# Patient Record
Sex: MALE | Race: WHITE | NOT HISPANIC OR LATINO | Employment: OTHER | URBAN - METROPOLITAN AREA
[De-identification: names, ages, dates, MRNs, and addresses within clinical notes are randomized per-mention and may not be internally consistent; named-entity substitution may affect disease eponyms.]

---

## 2017-01-20 ENCOUNTER — TELEPHONE (OUTPATIENT)
Dept: CARDIOLOGY | Facility: CLINIC | Age: 57
End: 2017-01-20

## 2017-01-20 NOTE — TELEPHONE ENCOUNTER
Patient is having tooth extracted. Needing to hold Xarelto 2-3 days. Is this ok?     Dr Moreno  P: [phone]  F: [phone]

## 2017-07-05 ENCOUNTER — CLINICAL SUPPORT NO REQUIREMENTS (OUTPATIENT)
Dept: CARDIOLOGY | Facility: CLINIC | Age: 57
End: 2017-07-05

## 2017-07-05 DIAGNOSIS — I63.9 CRYPTOGENIC STROKE (HCC): ICD-10-CM

## 2017-07-05 PROCEDURE — 93299 PR REM INTERROG ICPMS/SCRMS <30 D TECH REVIEW: CPT | Performed by: INTERNAL MEDICINE

## 2017-07-05 PROCEDURE — 93298 REM INTERROG DEV EVAL SCRMS: CPT | Performed by: INTERNAL MEDICINE

## 2017-08-18 RX ORDER — RIVAROXABAN 20 MG/1
TABLET, FILM COATED ORAL
Qty: 30 TABLET | Refills: 6 | Status: SHIPPED | OUTPATIENT
Start: 2017-08-18 | End: 2018-03-25 | Stop reason: SDUPTHER

## 2017-11-30 ENCOUNTER — TELEPHONE (OUTPATIENT)
Dept: CARDIOLOGY | Facility: CLINIC | Age: 57
End: 2017-11-30

## 2017-11-30 NOTE — TELEPHONE ENCOUNTER
I called Mr Winter to ask if could send in a manual transmission with his carelink monitor.He stated he did not think he had  a monitor and if he did have one he had not seen it in years. He will look for it when he gets home and call me back. I also ask him to reschedule his appointment with Dr Aldrich that he missed in March. He stated he would call back and reschedule.

## 2018-03-26 RX ORDER — RIVAROXABAN 20 MG/1
TABLET, FILM COATED ORAL
Qty: 30 TABLET | Refills: 5 | Status: SHIPPED | OUTPATIENT
Start: 2018-03-26 | End: 2018-09-30 | Stop reason: SDUPTHER

## 2018-05-30 ENCOUNTER — CLINICAL SUPPORT NO REQUIREMENTS (OUTPATIENT)
Dept: CARDIOLOGY | Facility: CLINIC | Age: 58
End: 2018-05-30

## 2018-05-30 DIAGNOSIS — I63.9 ACUTE CVA (CEREBROVASCULAR ACCIDENT) (HCC): Primary | ICD-10-CM

## 2018-05-31 PROCEDURE — 93298 REM INTERROG DEV EVAL SCRMS: CPT | Performed by: INTERNAL MEDICINE

## 2018-05-31 PROCEDURE — 93299 PR REM INTERROG ICPMS/SCRMS <30 D TECH REVIEW: CPT | Performed by: INTERNAL MEDICINE

## 2018-09-12 ENCOUNTER — CLINICAL SUPPORT NO REQUIREMENTS (OUTPATIENT)
Dept: CARDIOLOGY | Facility: CLINIC | Age: 58
End: 2018-09-12

## 2018-09-12 DIAGNOSIS — I48.92 ATRIAL FIBRILLATION AND FLUTTER (HCC): ICD-10-CM

## 2018-09-12 DIAGNOSIS — I48.91 ATRIAL FIBRILLATION AND FLUTTER (HCC): ICD-10-CM

## 2018-09-12 PROCEDURE — 93299 PR REM INTERROG ICPMS/SCRMS <30 D TECH REVIEW: CPT | Performed by: INTERNAL MEDICINE

## 2018-09-12 PROCEDURE — 93298 REM INTERROG DEV EVAL SCRMS: CPT | Performed by: INTERNAL MEDICINE

## 2018-09-26 ENCOUNTER — OFFICE VISIT (OUTPATIENT)
Dept: CARDIOLOGY | Facility: CLINIC | Age: 58
End: 2018-09-26

## 2018-09-26 VITALS
SYSTOLIC BLOOD PRESSURE: 130 MMHG | BODY MASS INDEX: 31.95 KG/M2 | HEART RATE: 67 BPM | WEIGHT: 249 LBS | DIASTOLIC BLOOD PRESSURE: 84 MMHG | HEIGHT: 74 IN

## 2018-09-26 DIAGNOSIS — Z45.09 ENCOUNTER FOR LOOP RECORDER CHECK: ICD-10-CM

## 2018-09-26 DIAGNOSIS — I10 ESSENTIAL HYPERTENSION: Primary | ICD-10-CM

## 2018-09-26 PROCEDURE — 93291 INTERROG DEV EVAL SCRMS IP: CPT | Performed by: PHYSICIAN ASSISTANT

## 2018-09-26 PROCEDURE — 99213 OFFICE O/P EST LOW 20 MIN: CPT | Performed by: PHYSICIAN ASSISTANT

## 2018-09-26 RX ORDER — CIPROFLOXACIN 500 MG/1
500 TABLET, FILM COATED ORAL 2 TIMES DAILY
Refills: 0 | COMMUNITY
Start: 2018-09-25 | End: 2020-01-29

## 2018-09-26 NOTE — PROGRESS NOTES
[facility] at [facility]   OFFICE NOTE      Donavan Winter  1960  PCP: Hayden Estrada MD    SUBJECTIVE:   Donavan Winter is a 58 y.o. male seen for a follow up visit regarding the following: prior CVA, HTN, PFO, Loop recorder    CC: Loop recorder interrogation  PROBLEM LIST:  1. Acute CVA, 06/22/2013.   a. A 7 mm discrete peripheral acute infarct of the left cerebellar hemisphere with small punctate acute infarcts at the central left cerebellum by MRI, 06/22/2013.  b. Transesophageal echocardiogram, 06/24/2013 - no evidence of thrombus with PFO noted.  c. Unremarkable carotid artery duplex.  d. Carotid vertebral angiography, 06/25/2013, unremarkable cervical and cerebral angiograms, widely patent left PICA.  e. Initiation of Xarelto and implantable loop recorder placement, 07/16/2013 to rule out the possibility of atrial fibrillation.   2. Chest pain.  a. GXT nuclear, 01/09/2014, left ventricular ejection fraction of 57%, small very focal stress induced apical defect, probably normal variant.  3. PFO noted by transesophageal echocardiogram, 06/24/2013./ Echo 2016 normal EF, Mild LVH, Mild-Moderate MR  4. AV malformation, incidental finding, June 2013 by MRI; AVM repair, 06/15/2014.  5. Hypertension.  6. Dyslipidemia.  7. GERD.  8. Intermittent tobacco abuse.  9. Obstructive sleep apnea with CPAP.  10. Ear infection -Cipro started per PCP this week  11. Surgical history:  a. 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   HPI:   Today Mr. Montes presents today for follow-up regarding prior history of cryptogenic CVA, hypertension, history of PFO and Loop recorder placement.  Mr. Winter states he's had no change in symptoms.  He has had no palpitations, dizziness, near-syncope, or syncope.  He reports his blood pressures well controlled.  He states she's had no recurrent stroke symptoms.  He states he's tolerating his Xarelto well with no bleeding issues.  He does have a recent ear  infection Dr. Estrada as prescribed him some Cipro.  He denies any chest pain or chest tightness suggesting as pectoris.  He denies heart failure symptoms.        ROS:  Review of Symptoms:  General: no recent weight loss/gain, weakness or fatigue  Skin: no rashes, lumps, or other skin changes  HEENT: recent ear infection  Respiratory: no cough or hemoptysis  Cardiovascular: no palpitations, and tachycardia  Gastrointestinal: no black/tarry stools or diarrhea  Urinary: no change in frequency or urgency  Peripheral Vascular: no claudication or leg cramps  Musculoskeletal: no muscle or joint pain/stiffness  Psychiatric: no depression or excessive stress  Neurological: no sensory or motor loss, no syncope  Hematologic: no anemia, easy bruising or bleeding  Endocrine: no thyroid problems, nor heat or cold intolerance    Cardiac PMH: (Old records have been reviewed and summarized below)      Past Medical History, Past Surgical History, Family history, Social History, and Medications were all reviewed with the patient today and updated as necessary.       Current Outpatient Prescriptions:   •  atorvastatin (LIPITOR) 40 MG tablet, Take 40 mg by mouth Daily., Disp: , Rfl: 0  •  cholecalciferol (VITAMIN D3) 1000 UNITS tablet, Take 4,000 Units by mouth Daily., Disp: , Rfl:   •  ciprofloxacin (CIPRO) 500 MG tablet, Take 500 mg by mouth 2 (Two) Times a Day., Disp: , Rfl: 0  •  coenzyme Q10 100 MG capsule, Take 200 mg by mouth Daily., Disp: , Rfl:   •  Esomeprazole Magnesium (NEXIUM 24HR PO), Take 1 capsule by mouth Daily., Disp: , Rfl:   •  lisinopril (PRINIVIL,ZESTRIL) 20 MG tablet, take 1 tablet by mouth once daily, Disp: , Rfl: 0  •  XARELTO 20 MG tablet, take 1 tablet by mouth once daily, Disp: 30 tablet, Rfl: 5      No Known Allergies  Patient Active Problem List   Diagnosis   • CVA (cerebral vascular accident) (CMS/HCC)   • Chest pain   • PFO (patent foramen ovale)   • AVM (arteriovenous malformation)   • Essential  "hypertension   • Dyslipidemia   • GERD (gastroesophageal reflux disease)   • MOJGAN on CPAP   • Encounter for loop recorder check     Past Medical History:   Diagnosis Date   • Acute CVA (cerebrovascular accident) (CMS/[facility])    • AVM (arteriovenous malformation)     incidental finding, June 2013 by MRI; AVM repair, 06/15/2014   • Chest pain     PT STATES THIS IS NO LONGER TRUE   • Dyslipidemia    • GERD (gastroesophageal reflux disease)    • History of loop recorder 09/15/2015     Interrogation of his implantable loop recorder demonstrates no significant atrial arrhythmias.     • Hypertension    • MOJGAN on CPAP    • PFO (patent foramen ovale)     noted by transesophageal echocardiogram, 06/24/2013   • Thrombocytopenia (CMS/HCC)     s/p splenectomy    • Tobacco abuse     intermittent      Past Surgical History:   Procedure Laterality Date   • BRAIN AVM REPAIR     • CARDIAC ELECTROPHYSIOLOGY PROCEDURE N/A 11/10/2016    Procedure: Loop recorder removal and reinsertion MDT;  Surgeon: Cruzito Aldrich MD;  Location: [facility] INVASIVE LOCATION;  Service:    • COLONOSCOPY      X6   • ENDOSCOPY     • OTHER SURGICAL HISTORY      implantable loop recorder    • SPLENECTOMY     • WISDOM TOOTH EXTRACTION       Family History   Problem Relation Age of Onset   • Heart attack Father      Social History   Substance Use Topics   • Smoking status: Former Smoker     Packs/day: 0.50     Years: 15.00     Types: Cigarettes     Quit date: 9/20/2013   • Smokeless tobacco: Never Used   • Alcohol use 0.6 - 1.2 oz/week     1 - 2 Cans of beer per week      Comment: occas         PHYSICAL EXAM:    /84 (BP Location: Left arm, Patient Position: Sitting)   Pulse 67   Ht 188 cm (74\")   Wt 113 kg (249 lb)   BMI 31.97 kg/m²        Wt Readings from Last 5 Encounters:   09/26/18 113 kg (249 lb)   11/10/16 115 kg (254 lb 10.1 oz)   09/20/16 111 kg (244 lb 9.6 oz)       BP Readings from Last 5 Encounters:   09/26/18 130/84   11/10/16 102/67 "   10/14/16 124/77   09/20/16 120/68       General appearance - Alert, well appearing, and in no distress   Mental status - Affect appropriate to mood.  Eyes - Sclerae anicteric,  ENMT - Hearing slightly decreased bilaterally, Dental hygiene good.  Neck - Carotids upstroke normal bilaterally, no bruits, no JVD.  Resp - Clear to auscultation, no wheezes, rales or rhonchi, symmetric air entry.  Heart - Normal rate, regular rhythm, normal S1, S2, no murmurs, rubs, clicks or gallops.  GI - Soft, nontender, nondistended, no masses or organomegaly.  Neurological - Grossly intact - normal speech, no focal findings  Musculoskeletal - No joint tenderness, deformity or swelling, no muscular tenderness noted.  Extremities - Peripheral pulses normal, no pedal edema, no clubbing or cyanosis.  Skin - Normal coloration and turgor.  Psych -  oriented to person, place, and time.    Medical problems and test results were reviewed with the patient today.     Echo 2016     · Left ventricular function is normal. Estimated EF = 54%.  · Left ventricular wall thickness is consistent with mild concentric hypertrophy.  · Mild-to-moderate mitral valve regurgitation is present       Device Interrogation:  Loop recorder interrogation reveals no evidence of significant arrhythmias battery voltage and life is good.    ASSESSMENT   1. Cryptogenic CVA:  Continue Xarelto.  Patient tolerating it well.   2. PFO: Echocardiogram 2016, normal EF, mild LVH,   2. HTN: Controlled  3. Loop recorder : Normal function.     PLAN  · Continue current medical therapy.   · Follow up in one year or sooner as needed.       9/26/2018  10:19 AM    Will Tessie CHAVARRIA

## 2018-10-01 RX ORDER — RIVAROXABAN 20 MG/1
TABLET, FILM COATED ORAL
Qty: 30 TABLET | Refills: 5 | Status: SHIPPED | OUTPATIENT
Start: 2018-10-01 | End: 2019-04-10 | Stop reason: SDUPTHER

## 2018-12-12 ENCOUNTER — CLINICAL SUPPORT NO REQUIREMENTS (OUTPATIENT)
Dept: CARDIOLOGY | Facility: CLINIC | Age: 58
End: 2018-12-12

## 2018-12-12 DIAGNOSIS — I63.9 CEREBROVASCULAR ACCIDENT (CVA), UNSPECIFIED MECHANISM (HCC): ICD-10-CM

## 2018-12-12 PROCEDURE — 93298 REM INTERROG DEV EVAL SCRMS: CPT | Performed by: INTERNAL MEDICINE

## 2018-12-12 PROCEDURE — 93299 PR REM INTERROG ICPMS/SCRMS <30 D TECH REVIEW: CPT | Performed by: INTERNAL MEDICINE

## 2019-01-04 ENCOUNTER — HOSPITAL ENCOUNTER (OUTPATIENT)
Dept: GENERAL RADIOLOGY | Facility: HOSPITAL | Age: 59
Discharge: HOME | End: 2019-01-04
Attending: INTERNAL MEDICINE | Admitting: INTERNAL MEDICINE

## 2019-01-04 ENCOUNTER — TRANSCRIBE ORDERS (OUTPATIENT)
Dept: ADMINISTRATIVE | Facility: HOSPITAL | Age: 59
End: 2019-01-04

## 2019-01-04 DIAGNOSIS — M79.671 ACUTE FOOT PAIN, RIGHT: Primary | ICD-10-CM

## 2019-01-04 PROCEDURE — 73630 X-RAY EXAM OF FOOT: CPT

## 2019-04-03 ENCOUNTER — CLINICAL SUPPORT NO REQUIREMENTS (OUTPATIENT)
Dept: CARDIOLOGY | Facility: CLINIC | Age: 59
End: 2019-04-03

## 2019-04-03 DIAGNOSIS — I63.9 CEREBROVASCULAR ACCIDENT (CVA), UNSPECIFIED MECHANISM (HCC): Primary | ICD-10-CM

## 2019-04-03 DIAGNOSIS — Q21.12 PFO (PATENT FORAMEN OVALE): ICD-10-CM

## 2019-04-03 DIAGNOSIS — I48.91 ATRIAL FIBRILLATION, UNSPECIFIED TYPE (HCC): ICD-10-CM

## 2019-04-03 PROCEDURE — 93299 PR REM INTERROG ICPMS/SCRMS <30 D TECH REVIEW: CPT | Performed by: INTERNAL MEDICINE

## 2019-04-03 PROCEDURE — 93298 REM INTERROG DEV EVAL SCRMS: CPT | Performed by: INTERNAL MEDICINE

## 2019-04-10 RX ORDER — RIVAROXABAN 20 MG/1
TABLET, FILM COATED ORAL
Qty: 90 TABLET | Refills: 1 | Status: SHIPPED | OUTPATIENT
Start: 2019-04-10 | End: 2019-10-07 | Stop reason: SDUPTHER

## 2019-07-03 ENCOUNTER — CLINICAL SUPPORT NO REQUIREMENTS (OUTPATIENT)
Dept: CARDIOLOGY | Facility: CLINIC | Age: 59
End: 2019-07-03

## 2019-07-03 ENCOUNTER — TELEPHONE (OUTPATIENT)
Dept: CARDIOLOGY | Facility: CLINIC | Age: 59
End: 2019-07-03

## 2019-07-03 DIAGNOSIS — I48.19 ATRIAL FIBRILLATION, PERSISTENT (HCC): ICD-10-CM

## 2019-07-03 PROCEDURE — 93298 REM INTERROG DEV EVAL SCRMS: CPT | Performed by: INTERNAL MEDICINE

## 2019-07-03 PROCEDURE — 93299 PR REM INTERROG ICPMS/SCRMS <30 D TECH REVIEW: CPT | Performed by: INTERNAL MEDICINE

## 2019-10-07 RX ORDER — RIVAROXABAN 20 MG/1
TABLET, FILM COATED ORAL
Qty: 90 TABLET | Refills: 3 | Status: SHIPPED | OUTPATIENT
Start: 2019-10-07 | End: 2020-10-05

## 2020-01-13 ENCOUNTER — TELEPHONE (OUTPATIENT)
Dept: CARDIOLOGY | Facility: CLINIC | Age: 60
End: 2020-01-13

## 2020-01-13 NOTE — TELEPHONE ENCOUNTER
Pt moved in October and does not know where his monitor is. He verbalized he would look for the monitor and set it up. I also transferred him to Sheri WILCOX To reschedule his last appointment with Dr Aldrich.

## 2020-01-29 ENCOUNTER — OFFICE VISIT (OUTPATIENT)
Dept: CARDIOLOGY | Facility: CLINIC | Age: 60
End: 2020-01-29

## 2020-01-29 VITALS
OXYGEN SATURATION: 98 % | BODY MASS INDEX: 34.01 KG/M2 | SYSTOLIC BLOOD PRESSURE: 142 MMHG | HEIGHT: 74 IN | DIASTOLIC BLOOD PRESSURE: 88 MMHG | HEART RATE: 62 BPM | WEIGHT: 265 LBS

## 2020-01-29 DIAGNOSIS — Q21.12 PFO (PATENT FORAMEN OVALE): ICD-10-CM

## 2020-01-29 DIAGNOSIS — I48.0 PAF (PAROXYSMAL ATRIAL FIBRILLATION) (HCC): Primary | ICD-10-CM

## 2020-01-29 DIAGNOSIS — I10 ESSENTIAL HYPERTENSION: ICD-10-CM

## 2020-01-29 PROCEDURE — 93291 INTERROG DEV EVAL SCRMS IP: CPT | Performed by: INTERNAL MEDICINE

## 2020-01-29 PROCEDURE — 99214 OFFICE O/P EST MOD 30 MIN: CPT | Performed by: INTERNAL MEDICINE

## 2020-01-29 RX ORDER — CHLORAL HYDRATE 500 MG
6000 CAPSULE ORAL
COMMUNITY

## 2020-01-29 NOTE — PROGRESS NOTES
Donavan BUSTILLO Winter  1960  Home phone not available    01/29/2020    [facility] CARDIOLOGY     Estrada, Hayden Farah MD  [address]    [place] [id number]    Chief Complaint   Patient presents with   • Hypertension       Problem List:   1. Acute CVA, 06/22/2013.   a. A 7 mm discrete peripheral acute infarct of the left cerebellar hemisphere with small punctate acute infarcts at the central left cerebellum by MRI, 06/22/2013.  b. Transesophageal echocardiogram, 06/24/2013 - no evidence of thrombus with PFO noted.  c. Unremarkable carotid artery duplex.  d. Carotid vertebral angiography, 06/25/2013, unremarkable cervical and cerebral angiograms, widely patent left PICA.  e. Initiation of Xarelto and implantable loop recorder placement, 07/16/2013 to rule out the possibility of atrial fibrillation.   2. Chest pain.  a. GXT nuclear, 01/09/2014, left ventricular ejection fraction of 57%, small very focal stress induced apical defect, probably normal variant.  3. PFO noted by transesophageal echocardiogram, 06/24/2013.Echo 2016 normal EF, Mild LVH, Mild-Moderate MR  4. AV malformation, incidental finding, June 2013 by MRI; AVM repair, 06/15/2014.  5. Hypertension.  6. Dyslipidemia.  7. GERD.  8. Intermittent tobacco abuse.  9. Obstructive sleep apnea with CPAP.  10. Ear infection -Cipro started per PCP this week  11. Surgical history:  a. 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      Allergies  No Known Allergies    Current Medications    Current Outpatient Medications:   •  atorvastatin (LIPITOR) 40 MG tablet, Take 40 mg by mouth Daily., Disp: , Rfl: 0  •  cholecalciferol (VITAMIN D3) 1000 UNITS tablet, Take 4,000 Units by mouth Daily., Disp: , Rfl:   •  coenzyme Q10 100 MG capsule, Take 200 mg by mouth Daily., Disp: , Rfl:   •  Esomeprazole Magnesium (NEXIUM 24HR PO), Take 1 capsule by mouth Daily., Disp: , Rfl:   •  lisinopril (PRINIVIL,ZESTRIL) 20 MG tablet, take 1 tablet by  "mouth once daily, Disp: , Rfl: 0  •  Omega-3 Fatty Acids (FISH OIL) 1000 MG capsule capsule, Take 6,000 mg by mouth Daily With Breakfast. 6 per day, Disp: , Rfl:   •  XARELTO 20 MG tablet, TAKE 1 TABLET BY MOUTH EVERY DAY, Disp: 90 tablet, Rfl: 3    History of Present Illness     Pt presents for follow up of CVA/HTN/PFO. Since we last saw the pt, pt denies any palps, SOB, CP, LH, and dizziness. Denies any hospitalizations, ER visits, bleeding, or TIA/CVA symptoms. Overall feels well. BP stable at home.     ROS:  General:  Denies fatigue, weight gain or loss  Cardiovascular:  Denies CP, PND, syncope, near syncope, edema or palpitations.  Pulmonary:  Denies ELI, cough, or wheezing      Vitals:    01/29/20 1130   BP: 142/88   BP Location: Right arm   Patient Position: Sitting   Pulse: 62   SpO2: 98%   Weight: 120 kg (265 lb)   Height: 188 cm (74\")     Body mass index is 34.02 kg/m².  PE:  General: NAD  Neck: no JVD, no carotid bruits, no TM  Heart RRR, NL S1, S2, S4 present, no rubs, murmurs  Lungs: CTA, no wheezes, rhonchi, or rales  Abd: soft, non-tender, NL BS  Ext: No musculoskeletal deformities, no edema, cyanosis, or clubbing  Psych: normal mood and affect    Diagnostic Data:    ILR: 2 episodes of PAF 6/12/19    Procedures    1. PAF (paroxysmal atrial fibrillation) (CMS/HCC)    2. Essential hypertension    3. PFO (patent foramen ovale)          Plan:  1. PAF: now with AF documented. Continue Xarelto.  Patient tolerating it well.   2. CVA: see # 1  3. PFO: Echocardiogram 2016, normal EF, mild LVH,   4. HTN: Controlled  5. Loop recorder : Normal function.        F/up in 12 months        "

## 2020-03-04 ENCOUNTER — CLINICAL SUPPORT NO REQUIREMENTS (OUTPATIENT)
Dept: CARDIOLOGY | Facility: CLINIC | Age: 60
End: 2020-03-04

## 2020-03-04 DIAGNOSIS — Q21.12 PFO (PATENT FORAMEN OVALE): Primary | ICD-10-CM

## 2020-03-04 DIAGNOSIS — I63.9 ACUTE CVA (CEREBROVASCULAR ACCIDENT) (HCC): ICD-10-CM

## 2020-03-04 PROCEDURE — G2066 INTER DEVC REMOTE 30D: HCPCS | Performed by: INTERNAL MEDICINE

## 2020-03-04 PROCEDURE — 93298 REM INTERROG DEV EVAL SCRMS: CPT | Performed by: INTERNAL MEDICINE

## 2020-05-13 ENCOUNTER — TELEPHONE (OUTPATIENT)
Dept: CARDIOLOGY | Facility: CLINIC | Age: 60
End: 2020-05-13

## 2020-06-03 ENCOUNTER — OFFICE VISIT (OUTPATIENT)
Dept: PULMONOLOGY | Facility: CLINIC | Age: 60
End: 2020-06-03

## 2020-06-03 VITALS
SYSTOLIC BLOOD PRESSURE: 142 MMHG | BODY MASS INDEX: 35.29 KG/M2 | HEART RATE: 65 BPM | DIASTOLIC BLOOD PRESSURE: 90 MMHG | OXYGEN SATURATION: 95 % | TEMPERATURE: 97.8 F | WEIGHT: 275 LBS | HEIGHT: 74 IN | RESPIRATION RATE: 16 BRPM

## 2020-06-03 DIAGNOSIS — I48.0 PAROXYSMAL ATRIAL FIBRILLATION (HCC): ICD-10-CM

## 2020-06-03 DIAGNOSIS — Z79.01 CHRONIC ANTICOAGULATION: ICD-10-CM

## 2020-06-03 DIAGNOSIS — R91.1 LUNG NODULE, SOLITARY: Primary | ICD-10-CM

## 2020-06-03 PROCEDURE — 99204 OFFICE O/P NEW MOD 45 MIN: CPT | Performed by: INTERNAL MEDICINE

## 2020-06-03 RX ORDER — LISINOPRIL 10 MG/1
10 TABLET ORAL DAILY
COMMUNITY
Start: 2020-04-10

## 2020-06-03 RX ORDER — TAMSULOSIN HYDROCHLORIDE 0.4 MG/1
1 CAPSULE ORAL
COMMUNITY
Start: 2020-05-26

## 2020-06-03 RX ORDER — ESOMEPRAZOLE MAGNESIUM 40 MG/1
40 CAPSULE, DELAYED RELEASE ORAL
COMMUNITY

## 2020-06-03 NOTE — PROGRESS NOTES
New Patient Pulmonary Office Visit      Patient Name: Donavan Winter    Referring Physician: Hayden Estrada MD    Chief Complaint:    Chief Complaint   Patient presents with   • Lung Nodule       History of Present Illness: Donavan Winter is a 60 y.o. male who is here today to establish care with Pulmonary.  Patient has a past medical history significant for a CVA in 2013, paroxysmal atrial fibrillation, hypertension, AVM s/p ressection and a PFO.  Who presents to pulmonary for evaluation of a pulmonary nodule.    Lung Nodule  He presents for evaluation and treatment of a lung nodule. The CT coronary from March 2020 incidentally showed a 5 mm nodule, pleural-based in the right lower lobe. The patient reports that the imaging was performed for coronary screening.  The patient denies other associated symptoms. He has a history of 20 pack years. The patient has no known exposure to tuberculosis. The patient does not have a history of cancer.  Patient notes that he works in the chito business for construction, and is around a significant mound of dirt, but no other exposure history.  He has not had any other CTs of his chest to see if the nodule was there in the past.  He is on Xarelto for atrial fibrillation and a previous CVA that occurred in 2013.  He denies having any breathing difficulties, chest pain, fever, chills, night sweats, or weight loss.  He notes that he exercises 3 to 4 days/week.      Review of Systems:   Review of Systems   Constitutional: Negative for activity change, appetite change, chills and diaphoresis.   HENT: Negative for congestion, postnasal drip, sinus pressure and voice change.    Eyes: Negative for blurred vision.   Respiratory: Negative for cough, shortness of breath and wheezing.    Cardiovascular: Negative for chest pain.   Gastrointestinal: Negative for abdominal pain.   Musculoskeletal: Negative for myalgias.   Skin: Negative for color change and dry skin.    Allergic/Immunologic: Negative for environmental allergies.   Neurological: Negative for weakness and confusion.   Hematological: Negative for adenopathy.   Psychiatric/Behavioral: Negative for sleep disturbance and depressed mood.       Past Medical History:   Past Medical History:   Diagnosis Date   • Acute CVA (cerebrovascular accident) (CMS/HCC)    • AVM (arteriovenous malformation)     incidental finding, 2013 by MRI; AVM repair, 06/15/2014   • Chest pain     PT STATES THIS IS NO LONGER TRUE   • Dyslipidemia    • GERD (gastroesophageal reflux disease)    • History of loop recorder 09/15/2015     Interrogation of his implantable loop recorder demonstrates no significant atrial arrhythmias.     • Hypertension    • MOJGAN on CPAP    • Paroxysmal atrial fibrillation (CMS/HCC) 6/3/2020   • PFO (patent foramen ovale)     noted by transesophageal echocardiogram, 2013   • Thrombocytopenia (CMS/[facility])     s/p splenectomy    • Tobacco abuse     intermittent        Past Surgical History:   Past Surgical History:   Procedure Laterality Date   • BRAIN AVM REPAIR     • CARDIAC ELECTROPHYSIOLOGY PROCEDURE N/A 11/10/2016    Procedure: Loop recorder removal and reinsertion MDT;  Surgeon: Cruzito Aldrich MD;  Location: [facility] INVASIVE LOCATION;  Service:    • COLONOSCOPY      X6   • ENDOSCOPY     • OTHER SURGICAL HISTORY      implantable loop recorder    • SPLENECTOMY     • WISDOM TOOTH EXTRACTION         Family History:   Family History   Problem Relation Age of Onset   • Heart attack Father        Social History:   Social History     Socioeconomic History   • Marital status:      Spouse name: Not on file   • Number of children: Not on file   • Years of education: Not on file   • Highest education level: Not on file   Tobacco Use   • Smoking status: Former Smoker     Packs/day: 0.50     Years: 15.00     Pack years: 7.50     Types: Cigarettes     Last attempt to quit: 2013     Years since quittin.7  "  • Smokeless tobacco: Never Used   Substance and Sexual Activity   • Alcohol use: Yes     Alcohol/week: 1.0 - 2.0 standard drinks     Types: 1 - 2 Cans of beer per week     Comment: occas   • Drug use: No   • Sexual activity: Defer       Medications:     Current Outpatient Medications:   •  atorvastatin (LIPITOR) 40 MG tablet, Take 40 mg by mouth Daily., Disp: , Rfl: 0  •  cholecalciferol (VITAMIN D3) 1000 UNITS tablet, Take 4,000 Units by mouth Daily., Disp: , Rfl:   •  coenzyme Q10 100 MG capsule, Take 200 mg by mouth Daily., Disp: , Rfl:   •  esomeprazole (nexIUM) 40 MG capsule, Nexium 40 mg capsule,delayed release  Daily, Disp: , Rfl:   •  lisinopril (PRINIVIL,ZESTRIL) 10 MG tablet, Take 10 mg by mouth Daily., Disp: , Rfl:   •  Omega-3 Fatty Acids (FISH OIL) 1000 MG capsule capsule, Take 6,000 mg by mouth Daily With Breakfast. 6 per day, Disp: , Rfl:   •  tamsulosin (FLOMAX) 0.4 MG capsule 24 hr capsule, Take 1 capsule by mouth every night at bedtime., Disp: , Rfl:   •  XARELTO 20 MG tablet, TAKE 1 TABLET BY MOUTH EVERY DAY, Disp: 90 tablet, Rfl: 3    Allergies:   No Known Allergies    Physical Exam:  Vital Signs:   Vitals:    06/03/20 1023   BP: 142/90   BP Location: Left arm   Patient Position: Sitting   Cuff Size: Adult   Pulse: 65   Resp: 16   Temp: 97.8 °F (36.6 °C)   SpO2: 95%  Comment: room air at rest   Weight: 125 kg (275 lb)   Height: 188 cm (74\")       Physical Exam   Constitutional: He is oriented to person, place, and time. He appears well-developed.   HENT:   Head: Normocephalic and atraumatic.   Nose: Nose normal.   Mouth/Throat: Oropharynx is clear and moist.   Eyes: Pupils are equal, round, and reactive to light. Conjunctivae are normal.   Neck: Normal range of motion. Neck supple.   Cardiovascular: Normal rate and regular rhythm. Exam reveals no gallop and no friction rub.   No murmur heard.  Pulmonary/Chest: Effort normal and breath sounds normal. He has no wheezes. He has no rales. "   Abdominal: Soft. Bowel sounds are normal.   Musculoskeletal: Normal range of motion. He exhibits no edema.   Neurological: He is alert and oriented to person, place, and time.   Skin: Skin is warm and dry. No erythema.   Psychiatric: He has a normal mood and affect. His behavior is normal.   Nursing note and vitals reviewed.      Results Review:   - I personally reviewed the pts imaging report from 3/12/2020 CT coronary showed a 5 mm right pleural-based nodule per report.  - I personally reviewed the pts Echo report from 10/14/2016 showed a left ventricular function was normal with an EF 54% and mild concentric hypertrophy.  -Personally reviewed the patient's chart and outside records.    Assessment / Plan:   Lung nodule, solitary  Paroxysmal atrial fibrillation (CMS/HCC)  Chronic anticoagulation  -5 mm nodule per Fleischner guidelines but not necessarily consider ongoing follow-up.  This was found incidentally.  Although given that we do not have a dedicated CT of the chest I will obtain a noncontrasted CT of the chest looking for other nodules or masses.  Unfortunately I do not have the physical images to review personally today and can only go off the report from March 2020.  I informed him that if the nodule is still present on the CT scan of the chest then we would do a follow-up likely in 1 year to document stability.  If the nodule is resolved and no further imaging will be required.    -Notably patient is on chronic anticoagulation with Xarelto due to a previous CVA and paroxysmal atrial fibrillation.  Therefore he would require bridging with Lovenox prior to any procedure that we performed.  -We will call him with the results of his CT.      Follow Up:   Return in about 1 year (around 6/3/2021) for CT of the chest to be performed in the next 2 weeks.     CLAIRE Osuna, DO  Pulmonary and Critical Care Medicine  Note Electronically Signed    Please note that portions of this note may have been completed  with a voice recognition program. Efforts were made to edit the dictations, but occasionally words are mistranscribed.

## 2020-06-09 ENCOUNTER — TRANSCRIBE ORDERS (OUTPATIENT)
Dept: PULMONOLOGY | Facility: CLINIC | Age: 60
End: 2020-06-09

## 2020-06-10 ENCOUNTER — HOSPITAL ENCOUNTER (OUTPATIENT)
Dept: CT IMAGING | Facility: HOSPITAL | Age: 60
Discharge: HOME | End: 2020-06-10
Admitting: INTERNAL MEDICINE

## 2020-06-10 DIAGNOSIS — R91.1 LUNG NODULE, SOLITARY: ICD-10-CM

## 2020-06-10 PROCEDURE — 71250 CT THORAX DX C-: CPT

## 2020-10-05 RX ORDER — RIVAROXABAN 20 MG/1
TABLET, FILM COATED ORAL
Qty: 90 TABLET | Refills: 3 | Status: SHIPPED | OUTPATIENT
Start: 2020-10-05 | End: 2021-09-30

## 2021-02-03 ENCOUNTER — OFFICE VISIT (OUTPATIENT)
Dept: CARDIOLOGY | Facility: CLINIC | Age: 61
End: 2021-02-03

## 2021-02-03 VITALS
OXYGEN SATURATION: 96 % | SYSTOLIC BLOOD PRESSURE: 130 MMHG | TEMPERATURE: 98.6 F | HEART RATE: 70 BPM | DIASTOLIC BLOOD PRESSURE: 78 MMHG | HEIGHT: 74 IN | WEIGHT: 250 LBS | BODY MASS INDEX: 32.08 KG/M2

## 2021-02-03 DIAGNOSIS — I48.0 PAROXYSMAL ATRIAL FIBRILLATION (HCC): Primary | ICD-10-CM

## 2021-02-03 DIAGNOSIS — Q21.12 PFO (PATENT FORAMEN OVALE): ICD-10-CM

## 2021-02-03 DIAGNOSIS — I10 ESSENTIAL HYPERTENSION: ICD-10-CM

## 2021-02-03 PROCEDURE — 93000 ELECTROCARDIOGRAM COMPLETE: CPT | Performed by: INTERNAL MEDICINE

## 2021-02-03 PROCEDURE — 99213 OFFICE O/P EST LOW 20 MIN: CPT | Performed by: INTERNAL MEDICINE

## 2021-02-03 RX ORDER — IBUPROFEN 800 MG/1
TABLET ORAL AS NEEDED
COMMUNITY
Start: 2021-01-07 | End: 2022-03-02

## 2021-02-03 NOTE — PROGRESS NOTES
Donavan BUSTILLO Winter  1960  Home phone not available    02/03/2021    [facility] CARDIOLOGY     Referring Provider: No ref. provider found     Hayden Estrada MD  [address] [facility]   [facility] [address]    Chief Complaint   Patient presents with   • PAF       Problem List:     1. PAF  a. CHADsVasc=3, on Xarelto  b. Found on loop recorder June 2019, Initiated on Xarelto 20 mg daily 1/2020.  2. Acute CVA, 06/22/2013.   a. A 7 mm discrete peripheral acute infarct of the left cerebellar hemisphere with small punctate acute infarcts at the central left cerebellum by MRI, 06/22/2013.  b. Transesophageal echocardiogram, 06/24/2013 - no evidence of thrombus with PFO noted.  c. Unremarkable carotid artery duplex.  d. Carotid vertebral angiography, 06/25/2013, unremarkable cervical and cerebral angiograms, widely patent left PICA.  e. Initiation of Xarelto and implantable loop recorder placement, 07/16/2013 to rule out the possibility of atrial fibrillation.   f. Replacement of implantable loop recorder, 11/2016  3. Chest pain.  a. GXT nuclear, 01/09/2014, left ventricular ejection fraction of 57%, small very focal stress induced apical defect, probably normal variant.  4. PFO noted by transesophageal echocardiogram, 06/24/2013.Echo 2016 normal EF, Mild LVH, Mild-Moderate MR  5. AV malformation, incidental finding, June 2013 by MRI; AVM repair, 06/15/2014.  6. Hypertension.  7. Dyslipidemia.  8. GERD.  9. Intermittent tobacco abuse.  10. Obstructive sleep apnea with CPAP.  11. Ear infection -Cipro started per PCP this week  12. Surgical history:  a. 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       Allergies  No Known Allergies    Current Medications    Current Outpatient Medications:   •  atorvastatin (LIPITOR) 40 MG tablet, Take 40 mg by mouth Daily., Disp: , Rfl: 0  •  cholecalciferol (VITAMIN D3) 1000 UNITS tablet, Take 2,000 Units by mouth Daily., Disp: , Rfl:   •  coenzyme Q10 100  "MG capsule, Take 200 mg by mouth Daily., Disp: , Rfl:   •  esomeprazole (nexIUM) 40 MG capsule, Nexium 40 mg capsule,delayed release  Daily, Disp: , Rfl:   •  ibuprofen (ADVIL,MOTRIN) 800 MG tablet, As Needed., Disp: , Rfl:   •  lisinopril (PRINIVIL,ZESTRIL) 10 MG tablet, Take 10 mg by mouth Daily., Disp: , Rfl:   •  Omega-3 Fatty Acids (FISH OIL) 1000 MG capsule capsule, Take 6,000 mg by mouth Daily With Breakfast. 6 per day, Disp: , Rfl:   •  tamsulosin (FLOMAX) 0.4 MG capsule 24 hr capsule, Take 1 capsule by mouth every night at bedtime., Disp: , Rfl:   •  Xarelto 20 MG tablet, TAKE 1 TABLET BY MOUTH EVERY DAY, Disp: 90 tablet, Rfl: 3    History of Present Illness     Pt presents for follow up of CVA/HTN/PAF/PFO. Since we last saw the pt, pt denies any palps, SOB, CP, LH, and dizziness. Denies any hospitalizations, ER visits, bleeding issues on xarelto, or TIA/CVA symptoms. Overall feels well. BP stable at home.      ROS:  General:  Denies fatigue, weight gain or loss  Cardiovascular:  Denies CP, PND, syncope, near syncope, edema or palpitations.  Pulmonary:  Denies ELI, cough, or wheezing      Vitals:    02/03/21 1116   BP: 130/78   BP Location: Left arm   Patient Position: Sitting   Pulse: 70   Temp: 98.6 °F (37 °C)   SpO2: 96%   Weight: 113 kg (250 lb)   Height: 188 cm (74\")     Body mass index is 32.1 kg/m².     PE:  General: NAD  Neck: no JVD, no carotid bruits, no TM  Heart RRR, NL S1, S2,no rubs, murmurs  Lungs: CTA, no wheezes, rhonchi, or rales  Abd: soft, non-tender, NL BS  Ext: No musculoskeletal deformities, no edema, cyanosis, or clubbing  Psych: normal mood and affect    Diagnostic Data:  ILR: unable to interrogate as is at EOL      ECG 12 Lead    Date/Time: 2/3/2021 11:53 AM  Performed by: Cruzito Aldrich MD  Authorized by: Cruzito Aldrich MD   Previous ECG: no previous ECG available  Rhythm: sinus rhythm  BPM: 65              1. Paroxysmal atrial fibrillation (CMS/HCC)    2. Essential " hypertension    3. PFO (patent foramen ovale)          Plan:    1. PAF: asymptomatic  -CHADsVasc=3, Continue Xarelto.  Patient tolerating it well.     2. CVA:   -see # 1    3. PFO:  - Echocardiogram 2016, normal EF, mild LVH: need repeat echocardiogram    4. HTN:   -Controlled, continue medications       F/U in 12 months     Scribed for Cruzito Aldrich MD by ELEANOR Sommers. 2/3/2021 11:53 EST     I, Cruzito Aldrich MD, personally performed the services described in this documentation as scribed by the above named individual in my presence, and it is both accurate and complete.  2/3/2021  11:53 EST

## 2021-09-30 RX ORDER — RIVAROXABAN 20 MG/1
TABLET, FILM COATED ORAL
Qty: 90 TABLET | Refills: 3 | Status: SHIPPED | OUTPATIENT
Start: 2021-09-30

## 2022-03-02 ENCOUNTER — OFFICE VISIT (OUTPATIENT)
Dept: CARDIOLOGY | Facility: CLINIC | Age: 62
End: 2022-03-02

## 2022-03-02 ENCOUNTER — HOSPITAL ENCOUNTER (OUTPATIENT)
Dept: CARDIOLOGY | Facility: HOSPITAL | Age: 62
Discharge: HOME | End: 2022-03-02
Admitting: INTERNAL MEDICINE

## 2022-03-02 VITALS
SYSTOLIC BLOOD PRESSURE: 122 MMHG | WEIGHT: 279 LBS | BODY MASS INDEX: 35.81 KG/M2 | HEIGHT: 74 IN | HEART RATE: 69 BPM | OXYGEN SATURATION: 96 % | DIASTOLIC BLOOD PRESSURE: 70 MMHG

## 2022-03-02 DIAGNOSIS — I10 PRIMARY HYPERTENSION: ICD-10-CM

## 2022-03-02 DIAGNOSIS — I48.0 PAROXYSMAL ATRIAL FIBRILLATION: Primary | ICD-10-CM

## 2022-03-02 DIAGNOSIS — I48.0 PAF (PAROXYSMAL ATRIAL FIBRILLATION): Primary | ICD-10-CM

## 2022-03-02 DIAGNOSIS — I63.40 CEREBROVASCULAR ACCIDENT (CVA) DUE TO EMBOLISM OF CEREBRAL ARTERY: ICD-10-CM

## 2022-03-02 DIAGNOSIS — Q21.12 PFO (PATENT FORAMEN OVALE): ICD-10-CM

## 2022-03-02 LAB
ASCENDING AORTA: 3.5 CM
BH CV ECHO MEAS - AO MAX PG (FULL): 7 MMHG
BH CV ECHO MEAS - AO MAX PG: 10.5 MMHG
BH CV ECHO MEAS - AO MEAN PG (FULL): 3.4 MMHG
BH CV ECHO MEAS - AO MEAN PG: 5.6 MMHG
BH CV ECHO MEAS - AO ROOT AREA (BSA CORRECTED): 1.2
BH CV ECHO MEAS - AO ROOT AREA: 7.2 CM^2
BH CV ECHO MEAS - AO ROOT DIAM: 3 CM
BH CV ECHO MEAS - AO V2 MAX: 161.8 CM/SEC
BH CV ECHO MEAS - AO V2 MEAN: 111.3 CM/SEC
BH CV ECHO MEAS - AO V2 VTI: 32.5 CM
BH CV ECHO MEAS - ASC AORTA: 3.5 CM
BH CV ECHO MEAS - AVA(I,A): 2.3 CM^2
BH CV ECHO MEAS - AVA(I,D): 2.3 CM^2
BH CV ECHO MEAS - AVA(V,A): 2.1 CM^2
BH CV ECHO MEAS - AVA(V,D): 2.1 CM^2
BH CV ECHO MEAS - BSA(HAYCOCK): 2.6 M^2
BH CV ECHO MEAS - BSA: 2.5 M^2
BH CV ECHO MEAS - BZI_BMI: 35.8 KILOGRAMS/M^2
BH CV ECHO MEAS - BZI_METRIC_HEIGHT: 188 CM
BH CV ECHO MEAS - BZI_METRIC_WEIGHT: 126.6 KG
BH CV ECHO MEAS - EDV(CUBED): 118.5 ML
BH CV ECHO MEAS - EDV(MOD-SP2): 115 ML
BH CV ECHO MEAS - EDV(MOD-SP4): 133 ML
BH CV ECHO MEAS - EDV(TEICH): 113.4 ML
BH CV ECHO MEAS - EF(CUBED): 66.5 %
BH CV ECHO MEAS - EF(MOD-BP): 57 %
BH CV ECHO MEAS - EF(MOD-SP2): 60 %
BH CV ECHO MEAS - EF(MOD-SP4): 55.6 %
BH CV ECHO MEAS - EF(TEICH): 57.9 %
BH CV ECHO MEAS - ESV(CUBED): 39.7 ML
BH CV ECHO MEAS - ESV(MOD-SP2): 46 ML
BH CV ECHO MEAS - ESV(MOD-SP4): 59 ML
BH CV ECHO MEAS - ESV(TEICH): 47.8 ML
BH CV ECHO MEAS - FS: 30.6 %
BH CV ECHO MEAS - IVS/LVPW: 1
BH CV ECHO MEAS - IVSD: 1.3 CM
BH CV ECHO MEAS - LA DIMENSION: 4.4 CM
BH CV ECHO MEAS - LA/AO: 1.4
BH CV ECHO MEAS - LAD MAJOR: 5.5 CM
BH CV ECHO MEAS - LAT PEAK E' VEL: 9.4 CM/SEC
BH CV ECHO MEAS - LATERAL E/E' RATIO: 7.3
BH CV ECHO MEAS - LV DIASTOLIC VOL/BSA (35-75): 53.1 ML/M^2
BH CV ECHO MEAS - LV IVRT: 0.11 SEC
BH CV ECHO MEAS - LV MASS(C)D: 245.6 GRAMS
BH CV ECHO MEAS - LV MASS(C)DI: 98.1 GRAMS/M^2
BH CV ECHO MEAS - LV MAX PG: 3.5 MMHG
BH CV ECHO MEAS - LV MEAN PG: 2.1 MMHG
BH CV ECHO MEAS - LV SYSTOLIC VOL/BSA (12-30): 23.6 ML/M^2
BH CV ECHO MEAS - LV V1 MAX: 93.8 CM/SEC
BH CV ECHO MEAS - LV V1 MEAN: 68.8 CM/SEC
BH CV ECHO MEAS - LV V1 VTI: 20.1 CM
BH CV ECHO MEAS - LVIDD: 4.9 CM
BH CV ECHO MEAS - LVIDS: 3.4 CM
BH CV ECHO MEAS - LVLD AP2: 8.9 CM
BH CV ECHO MEAS - LVLD AP4: 8.7 CM
BH CV ECHO MEAS - LVLS AP2: 6.6 CM
BH CV ECHO MEAS - LVLS AP4: 7.1 CM
BH CV ECHO MEAS - LVOT AREA (M): 3.8 CM^2
BH CV ECHO MEAS - LVOT AREA: 3.7 CM^2
BH CV ECHO MEAS - LVOT DIAM: 2.2 CM
BH CV ECHO MEAS - LVPWD: 1.3 CM
BH CV ECHO MEAS - MED PEAK E' VEL: 4.9 CM/SEC
BH CV ECHO MEAS - MEDIAL E/E' RATIO: 13.9
BH CV ECHO MEAS - MV A MAX VEL: 62.7 CM/SEC
BH CV ECHO MEAS - MV DEC TIME: 0.26 SEC
BH CV ECHO MEAS - MV E MAX VEL: 70.6 CM/SEC
BH CV ECHO MEAS - MV E/A: 1.1
BH CV ECHO MEAS - PA ACC SLOPE: 672.5 CM/SEC^2
BH CV ECHO MEAS - PA ACC TIME: 0.14 SEC
BH CV ECHO MEAS - PA MAX PG: 5.9 MMHG
BH CV ECHO MEAS - PA PR(ACCEL): 15.6 MMHG
BH CV ECHO MEAS - PA V2 MAX: 121.2 CM/SEC
BH CV ECHO MEAS - SI(AO): 93.5 ML/M^2
BH CV ECHO MEAS - SI(CUBED): 31.5 ML/M^2
BH CV ECHO MEAS - SI(LVOT): 29.6 ML/M^2
BH CV ECHO MEAS - SI(MOD-SP2): 27.6 ML/M^2
BH CV ECHO MEAS - SI(MOD-SP4): 29.6 ML/M^2
BH CV ECHO MEAS - SI(TEICH): 26.2 ML/M^2
BH CV ECHO MEAS - SV(AO): 234 ML
BH CV ECHO MEAS - SV(CUBED): 78.8 ML
BH CV ECHO MEAS - SV(LVOT): 74 ML
BH CV ECHO MEAS - SV(MOD-SP2): 69 ML
BH CV ECHO MEAS - SV(MOD-SP4): 74 ML
BH CV ECHO MEAS - SV(TEICH): 65.6 ML
BH CV ECHO MEAS - TAPSE (>1.6): 3 CM
BH CV ECHO MEASUREMENTS AVERAGE E/E' RATIO: 9.87
BH CV XLRA - RV BASE: 3.7 CM
BH CV XLRA - RV LENGTH: 8.8 CM
BH CV XLRA - RV MID: 3.8 CM
BH CV XLRA - TDI S': 15.7 CM/SEC
IVRT: 113 MSEC
LEFT ATRIUM VOLUME INDEX: 24.8 ML/M^2
LEFT ATRIUM VOLUME: 62 ML

## 2022-03-02 PROCEDURE — 93306 TTE W/DOPPLER COMPLETE: CPT

## 2022-03-02 PROCEDURE — 93306 TTE W/DOPPLER COMPLETE: CPT | Performed by: INTERNAL MEDICINE

## 2022-03-02 PROCEDURE — 99213 OFFICE O/P EST LOW 20 MIN: CPT | Performed by: INTERNAL MEDICINE

## 2022-03-02 NOTE — PROGRESS NOTES
Donavan Winter  1960  Home phone not available    03/02/2022    [facility] CARDIOLOGY     Referring Provider: No ref. provider found     Hayden Estrada MD  [address] [id number]    Chief Complaint   Patient presents with   • Atrial Fibrillation       Problem List:   1. PAF  a. CHADsVasc=3, on Xarelto  b. Found on loop recorder June 2019, Initiated on Xarelto 20 mg daily 1/2020.  2. Acute CVA, 06/22/2013.   a. A 7 mm discrete peripheral acute infarct of the left cerebellar hemisphere with small punctate acute infarcts at the central left cerebellum by MRI, 06/22/2013.  b. Transesophageal echocardiogram, 06/24/2013 - no evidence of thrombus with PFO noted.  c. Unremarkable carotid artery duplex.  d. Carotid vertebral angiography, 06/25/2013, unremarkable cervical and cerebral angiograms, widely patent left PICA.  e. Initiation of Xarelto and implantable loop recorder placement, 07/16/2013 to rule out the possibility of atrial fibrillation.   f. Replacement of implantable loop recorder, 11/2016  3. Chest pain.  a. GXT nuclear, 01/09/2014, left ventricular ejection fraction of 57%, small very focal stress induced apical defect, probably normal variant.  4. PFO noted by transesophageal echocardiogram, 06/24/2013.Echo 2016 normal EF, Mild LVH, Mild-Moderate MR  5. AV malformation, incidental finding, June 2013 by MRI; AVM repair, 06/15/2014.  6. Hypertension.  7. Dyslipidemia.  8. GERD.  9. Intermittent tobacco abuse.  10. Obstructive sleep apnea with CPAP.  11. Ear infection -Cipro started per PCP this week  12. Surgical history:  a. 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    Allergies  No Known Allergies    Current Medications    Current Outpatient Medications:   •  atorvastatin (LIPITOR) 40 MG tablet, Take 40 mg by mouth Daily., Disp: , Rfl: 0  •  cholecalciferol (VITAMIN D3) 1000 UNITS tablet, Take 2,000 Units by mouth Daily., Disp: , Rfl:   •  coenzyme Q10 100  "MG capsule, Take 200 mg by mouth Daily., Disp: , Rfl:   •  esomeprazole (nexIUM) 40 MG capsule, Take 40 mg by mouth Every Morning Before Breakfast., Disp: , Rfl:   •  lisinopril (PRINIVIL,ZESTRIL) 10 MG tablet, Take 10 mg by mouth Daily., Disp: , Rfl:   •  Omega-3 Fatty Acids (FISH OIL) 1000 MG capsule capsule, Take 6,000 mg by mouth Daily With Breakfast. 6 per day, Disp: , Rfl:   •  RESVERATROL PO, Take 2 tablets by mouth Every Night., Disp: , Rfl:   •  tamsulosin (FLOMAX) 0.4 MG capsule 24 hr capsule, Take 1 capsule by mouth every night at bedtime., Disp: , Rfl:   •  Xarelto 20 MG tablet, TAKE 1 TABLET BY MOUTH EVERY DAY, Disp: 90 tablet, Rfl: 3    History of Present Illness     Pt presents for follow up of PAF/CVA/HTN. Since we last saw the pt, pt denies any AF episodes, SOB, CP, LH, and dizziness. Denies any hospitalizations, ER visits, bleeding, or TIA/CVA symptoms. Overall feels well. BP stable    ROS:  General:  Denies fatigue, + weight gain   Cardiovascular:  Denies CP, PND, syncope, near syncope, edema or palpitations.  Pulmonary:  Denies ELI, cough, or wheezing      Vitals:    03/02/22 0950   BP: 122/70   BP Location: Left arm   Patient Position: Sitting   Pulse: 69   SpO2: 96%   Weight: 127 kg (279 lb)   Height: 188 cm (74\")     Body mass index is 35.82 kg/m².  PE:  General: NAD  Neck: no JVD, no carotid bruits, no TM  Heart RRR, NL S1, S2, S4 present, no rubs, murmurs  Lungs: CTA, no wheezes, rhonchi, or rales  Abd: soft, non-tender, NL BS  Ext: No musculoskeletal deformities, no edema, cyanosis, or clubbing  Psych: normal mood and affect    Diagnostic Data:      Procedures    1. PAF (paroxysmal atrial fibrillation) (HCC)    2. Cerebrovascular accident (CVA) due to embolism of cerebral artery (HCC)    3. PFO (patent foramen ovale)    4. Primary hypertension          Plan:  1. PAF: asymptomatic  -CHADsVasc=3, Continue Xarelto.  Patient tolerating it well.      2. CVA:   -see # 1     3. PFO:  - " Echocardiogram 2016, normal EF, mild LVH: need repeat echocardiogram     4. HTN:   -Controlled, continue medications    F/up in 12 months

## 2022-03-04 ENCOUNTER — TELEPHONE (OUTPATIENT)
Dept: CARDIOLOGY | Facility: CLINIC | Age: 62
End: 2022-03-04

## 2022-03-04 NOTE — TELEPHONE ENCOUNTER
Cruzito Aldrich MD Nolan, Katie H, RN  Please call patient tell him that the results of the echocardiogram was excellent.  LVEF was normal.  No significant valvular heart disease.     Patient notified.

## 2023-03-08 ENCOUNTER — OFFICE VISIT (OUTPATIENT)
Dept: CARDIOLOGY | Facility: CLINIC | Age: 63
End: 2023-03-08
Payer: COMMERCIAL

## 2023-03-08 VITALS
BODY MASS INDEX: 32.85 KG/M2 | SYSTOLIC BLOOD PRESSURE: 120 MMHG | DIASTOLIC BLOOD PRESSURE: 78 MMHG | HEIGHT: 74 IN | OXYGEN SATURATION: 97 % | HEART RATE: 71 BPM | WEIGHT: 256 LBS

## 2023-03-08 DIAGNOSIS — I10 ESSENTIAL HYPERTENSION: ICD-10-CM

## 2023-03-08 DIAGNOSIS — Q21.12 PFO (PATENT FORAMEN OVALE): ICD-10-CM

## 2023-03-08 DIAGNOSIS — I48.0 PAROXYSMAL ATRIAL FIBRILLATION: Primary | ICD-10-CM

## 2023-03-08 PROCEDURE — 99213 OFFICE O/P EST LOW 20 MIN: CPT

## 2023-03-08 PROCEDURE — 93000 ELECTROCARDIOGRAM COMPLETE: CPT

## 2023-03-08 NOTE — PROGRESS NOTES
Donavan Winter  1960  Home phone not available    03/08/2023    [facility] CARDIOLOGY MAIN CAMPUS     Referring Provider: No ref. provider found     Azalea Yoon,   [address] [facility] [address] [address] [place] [id number]    Chief Complaint   Patient presents with   • Atrial Fibrillation       Problem List:   1. PAF  a. CHADsVasc=3, on Xarelto  b. Found on loop recorder June 2019, Initiated on Xarelto 20 mg daily 1/2020.  c. Echo 3/2/22: LVEF 56-60%, mild concentric hypertrophy, LV diastolic function was normal, mild LA enlargement  2. Acute CVA, 06/22/2013.   a. A 7 mm discrete peripheral acute infarct of the left cerebellar hemisphere with small punctate acute infarcts at the central left cerebellum by MRI, 06/22/2013.  b. Transesophageal echocardiogram, 06/24/2013 - no evidence of thrombus with PFO noted.  c. Unremarkable carotid artery duplex.  d. Carotid vertebral angiography, 06/25/2013, unremarkable cervical and cerebral angiograms, widely patent left PICA.  e. Initiation of Xarelto and implantable loop recorder placement, 07/16/2013 to rule out the possibility of atrial fibrillation.   f. Replacement of implantable loop recorder, 11/2016  3. Chest pain.  a. GXT nuclear, 01/09/2014, left ventricular ejection fraction of 57%, small very focal stress induced apical defect, probably normal variant.  4. PFO noted by transesophageal echocardiogram, 06/24/2013.Echo 2016 normal EF, Mild LVH, Mild-Moderate MR  5. AV malformation, incidental finding, June 2013 by MRI; AVM repair, 06/15/2014.  6. Hypertension.  7. Dyslipidemia.  8. GERD.  9. Intermittent tobacco abuse.  10. Obstructive sleep apnea with CPAP.  11. Ear infection -Cipro started per PCP this week  12. Surgical history:  a. 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    Allergies  No Known Allergies    Current Medications    Current Outpatient Medications:   •  atorvastatin (LIPITOR) 40 MG tablet, Take 1 tablet by mouth Daily., Disp: , Rfl:  "0  •  cholecalciferol (VITAMIN D3) 1000 UNITS tablet, Take 2 tablets by mouth Daily., Disp: , Rfl:   •  coenzyme Q10 100 MG capsule, Take 2 capsules by mouth Daily., Disp: , Rfl:   •  esomeprazole (nexIUM) 40 MG capsule, Take 1 capsule by mouth Every Morning Before Breakfast., Disp: , Rfl:   •  lisinopril (PRINIVIL,ZESTRIL) 10 MG tablet, Take 1 tablet by mouth Daily., Disp: , Rfl:   •  Omega-3 Fatty Acids (FISH OIL) 1000 MG capsule capsule, Take 6 capsules by mouth Daily With Breakfast. 6 per day, Disp: , Rfl:   •  RESVERATROL PO, Take 2 tablets by mouth Every Night., Disp: , Rfl:   •  tamsulosin (FLOMAX) 0.4 MG capsule 24 hr capsule, Take 1 capsule by mouth every night at bedtime., Disp: , Rfl:   •  Xarelto 20 MG tablet, TAKE 1 TABLET BY MOUTH EVERY DAY, Disp: 90 tablet, Rfl: 3    History of Present Illness     Pt presents for follow up of PAF/CVA/HTN. Since we last saw the pt, pt denies any palpitations, SOB, CP, LH, and dizziness. Denies any hospitalizations, ER visits, bleeding, or TIA/CVA symptoms. Has lost 30# intentionally since thanksgiving. BP is well controlled on lisinopril. Overall feels well.    ROS:  General:  Denies fatigue, + weight loss  Cardiovascular:  Denies CP, PND, syncope, near syncope, edema or palpitations.  Pulmonary:  Denies ELI, cough, or wheezing      Vitals:    03/08/23 0935   BP: 120/78   BP Location: Left arm   Patient Position: Sitting   Pulse: 71   SpO2: 97%   Weight: 116 kg (256 lb)   Height: 188 cm (74\")     Body mass index is 32.87 kg/m².  PE:  General: NAD  Neck: no JVD, no carotid bruits, no TM  Heart RRR, NL S1, S2, S4 present, no rubs, murmurs  Lungs: CTA, no wheezes, rhonchi, or rales  Abd: soft, non-tender, NL BS  Ext: No musculoskeletal deformities, no edema, cyanosis, or clubbing  Psych: normal mood and affect    Diagnostic Data:        ECG 12 Lead    Date/Time: 3/8/2023 9:55 AM  Performed by: Adore Murrieta APRN  Authorized by: Adore Murrieta APRN "   Comparison: compared with previous ECG from 3/8/2023  Rhythm: sinus rhythm  Rate: normal  BPM: 60                  1. Paroxysmal atrial fibrillation (HCC)    2. PFO (patent foramen ovale)    3. Essential hypertension          Plan:  1. PAF: asymptomatic  -CHADsVasc=3, Continue Xarelto.  Patient tolerating it well.      2. CVA:   -see # 1     3. PFO:  - Echocardiogram 2016, normal EF, mild LVH  -Echo 3/2/22: LVEF 56-60%, mild concentric hypertrophy, LV diastolic function was normal, mild LA enlargement      4. HTN:   -Controlled, continue medications     F/up in 12 months      Electronically signed by ELEANOR Morales, 03/08/23, 9:50 AM EST.    I, Cruzito Aldrich MD, personally performed the services described in this documentation as scribed by the above named individual in my presence, and it is both accurate and complete.  3/8/2023  10:00 EST

## 2024-03-20 ENCOUNTER — OFFICE VISIT (OUTPATIENT)
Dept: CARDIOLOGY | Facility: CLINIC | Age: 64
End: 2024-03-20
Payer: COMMERCIAL

## 2024-03-20 VITALS
DIASTOLIC BLOOD PRESSURE: 70 MMHG | BODY MASS INDEX: 34.83 KG/M2 | SYSTOLIC BLOOD PRESSURE: 132 MMHG | HEART RATE: 73 BPM | OXYGEN SATURATION: 96 % | HEIGHT: 74 IN | WEIGHT: 271.4 LBS

## 2024-03-20 DIAGNOSIS — Q21.12 PFO (PATENT FORAMEN OVALE): ICD-10-CM

## 2024-03-20 DIAGNOSIS — G47.33 OSA ON CPAP: ICD-10-CM

## 2024-03-20 DIAGNOSIS — I48.0 PAROXYSMAL ATRIAL FIBRILLATION: Primary | ICD-10-CM

## 2024-03-20 DIAGNOSIS — I10 ESSENTIAL HYPERTENSION: ICD-10-CM

## 2024-03-20 DIAGNOSIS — I63.9 CEREBROVASCULAR ACCIDENT (CVA), UNSPECIFIED MECHANISM: ICD-10-CM

## 2024-03-20 RX ORDER — ATORVASTATIN CALCIUM 20 MG/1
1 TABLET, FILM COATED ORAL DAILY
COMMUNITY
Start: 2024-02-22

## 2024-03-20 NOTE — PROGRESS NOTES
Donavan Winter  1960  Home phone not available    03/20/2024    [facility] CARDIOLOGY     Referring Provider: No ref. provider found     Azalea Yoon DO  [address] [address]    Chief Complaint   Patient presents with    Paroxysmal atrial fibrillation     1-Yr F/U       Problem List:     PAF  CHADsVasc=3, on Xarelto  Found on loop recorder June 2019, Initiated on Xarelto 20 mg daily 1/2020.  Echo 3/2/22: LVEF 56-60%, mild concentric hypertrophy, LV diastolic function was normal, mild LA enlargement  Acute CVA, 06/22/2013.   A 7 mm discrete peripheral acute infarct of the left cerebellar hemisphere with small punctate acute infarcts at the central left cerebellum by MRI, 06/22/2013.  Transesophageal echocardiogram, 06/24/2013 - no evidence of thrombus with PFO noted.  Unremarkable carotid artery duplex.  Carotid vertebral angiography, 06/25/2013, unremarkable cervical and cerebral angiograms, widely patent left PICA.  Initiation of Xarelto and implantable loop recorder placement, 07/16/2013 to rule out the possibility of atrial fibrillation.   Replacement of implantable loop recorder, 11/2016  Chest pain.  GXT nuclear, 01/09/2014, left ventricular ejection fraction of 57%, small very focal stress induced apical defect, probably normal variant.  PFO noted by transesophageal echocardiogram, 06/24/2013.Echo 2016 normal EF, Mild LVH, Mild-Moderate MR  AV malformation, incidental finding, June 2013 by MRI; AVM repair, 06/15/2014.  Hypertension.  Dyslipidemia.  GERD.  Intermittent tobacco abuse.  Obstructive sleep apnea with CPAP.  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  Allergies  No Known Allergies    Current Medications    Current Outpatient Medications:     atorvastatin (LIPITOR) 20 MG tablet, Take 1 tablet by mouth Daily., Disp: , Rfl:     coenzyme Q10 100 MG capsule, Take 2 capsules by mouth  "Daily., Disp: , Rfl:     esomeprazole (nexIUM) 40 MG capsule, Take 1 capsule by mouth Every Morning Before Breakfast., Disp: , Rfl:     lisinopril (PRINIVIL,ZESTRIL) 10 MG tablet, Take 1 tablet by mouth Daily., Disp: , Rfl:     multivitamin with minerals (MULTIVITAMIN ADULT PO), Take 1 tablet by mouth Daily., Disp: , Rfl:     Omega-3 Fatty Acids (FISH OIL) 1000 MG capsule capsule, Take 6 capsules by mouth Daily With Breakfast. 6 per day, Disp: , Rfl:     RESVERATROL PO, Take 2 tablets by mouth Every Night., Disp: , Rfl:     tamsulosin (FLOMAX) 0.4 MG capsule 24 hr capsule, Take 1 capsule by mouth every night at bedtime., Disp: , Rfl:     Xarelto 20 MG tablet, TAKE 1 TABLET BY MOUTH EVERY DAY, Disp: 90 tablet, Rfl: 3    cholecalciferol (VITAMIN D3) 1000 UNITS tablet, Take 2 tablets by mouth Daily. (Patient not taking: Reported on 3/20/2024), Disp: , Rfl:     History of Present Illness     Pt presents for follow up of AF/HTN/CVA. Since we last saw the pt, pt denies any AF episodes, SOB, CP, LH, and dizziness. Denies any hospitalizations, ER visits, bleeding, or TIA/CVA symptoms. Overall feels well. Blood pressures have been stable at home.  He has been compliant with his CPAP.  He also has been compliant with his medical therapy.          Vitals:    03/20/24 0937   BP: 132/70   BP Location: Left arm   Patient Position: Sitting   Cuff Size: Adult   Pulse: 73   SpO2: 96%   Weight: 123 kg (271 lb 6.4 oz)   Height: 188 cm (74\")     Body mass index is 34.85 kg/m².  PE:  General: NAD  Neck: no JVD, no carotid bruits, no TM  Heart RRR, NL S1, S2, S4 present, no rubs, murmurs  Lungs: CTA, no wheezes, rhonchi, or rales  Abd: soft, non-tender, NL BS  Ext: No musculoskeletal deformities, no edema, cyanosis, or clubbing  Psych: normal mood and affect    Diagnostic Data:    Review of twelve-lead EKG from March 2023 demonstrates sinus bradycardia with no acute changes noted.    Procedures         1. Paroxysmal atrial fibrillation  "   2. Cerebrovascular accident (CVA), unspecified mechanism    3. PFO (patent foramen ovale)    4. Essential hypertension    5. MOJGAN on CPAP          Plan:    1. PAF: asymptomatic  -CHADsVasc=3, Continue Xarelto.  Patient tolerating it well.      2. CVA:   -see # 1     3. PFO: Asymptomatic at this time.  - Echocardiogram 2016, normal EF, mild LVH  -Echo 3/2/22: LVEF 56-60%, mild concentric hypertrophy, LV diastolic function was normal, mild LA enlargement      4. HTN:   -Controlled, continue medications; encourage weight loss.    5. MOJGAN on CPAP no changes for now.    F/up in 12 months

## 2025-02-10 ENCOUNTER — TRANSCRIBE ORDERS (OUTPATIENT)
Dept: ADMINISTRATIVE | Facility: HOSPITAL | Age: 65
End: 2025-02-10
Payer: COMMERCIAL

## 2025-02-10 DIAGNOSIS — Z87.891 HISTORY OF TOBACCO ABUSE: Primary | ICD-10-CM

## 2025-02-17 ENCOUNTER — HOSPITAL ENCOUNTER (OUTPATIENT)
Dept: CT IMAGING | Facility: HOSPITAL | Age: 65
Discharge: HOME | End: 2025-02-17
Admitting: FAMILY MEDICINE
Payer: COMMERCIAL

## 2025-02-17 DIAGNOSIS — Z87.891 HISTORY OF TOBACCO ABUSE: ICD-10-CM

## 2025-02-17 PROCEDURE — 71271 CT THORAX LUNG CANCER SCR C-: CPT

## 2025-03-26 ENCOUNTER — OFFICE VISIT (OUTPATIENT)
Dept: CARDIOLOGY | Facility: CLINIC | Age: 65
End: 2025-03-26
Payer: COMMERCIAL

## 2025-03-26 ENCOUNTER — PATIENT ROUNDING (BHMG ONLY) (OUTPATIENT)
Dept: CARDIOLOGY | Facility: CLINIC | Age: 65
End: 2025-03-26
Payer: COMMERCIAL

## 2025-03-26 VITALS
HEART RATE: 74 BPM | DIASTOLIC BLOOD PRESSURE: 78 MMHG | WEIGHT: 233 LBS | BODY MASS INDEX: 29.9 KG/M2 | HEIGHT: 74 IN | OXYGEN SATURATION: 98 % | SYSTOLIC BLOOD PRESSURE: 118 MMHG

## 2025-03-26 DIAGNOSIS — Q21.12 PFO (PATENT FORAMEN OVALE): ICD-10-CM

## 2025-03-26 DIAGNOSIS — I63.9 CEREBROVASCULAR ACCIDENT (CVA), UNSPECIFIED MECHANISM: ICD-10-CM

## 2025-03-26 DIAGNOSIS — I48.0 PAROXYSMAL ATRIAL FIBRILLATION: Primary | ICD-10-CM

## 2025-03-26 DIAGNOSIS — I10 ESSENTIAL HYPERTENSION: ICD-10-CM

## 2025-03-26 DIAGNOSIS — G47.33 OSA ON CPAP: ICD-10-CM

## 2025-03-26 RX ORDER — SEMAGLUTIDE 0.5 MG/.5ML
0.5 INJECTION, SOLUTION SUBCUTANEOUS WEEKLY
COMMUNITY

## 2025-03-26 NOTE — PROGRESS NOTES
Donavan Winter  1960  [phone]    03/26/2025    [facility] CARDIOLOGY     Referring Provider: No ref. provider found     Karrie Azalea DO RASHI  [address]  [place] [id number]    Chief Complaint   Patient presents with    Paroxysmal atrial fibrillation     Problem List:      PAF  CHADsVasc=3, on Xarelto  Found on loop recorder June 2019, Initiated on Xarelto 20 mg daily 1/2020.  Echo 3/2/22: LVEF 56-60%, mild concentric hypertrophy, LV diastolic function was normal, mild LA enlargement  Acute CVA, 06/22/2013.   A 7 mm discrete peripheral acute infarct of the left cerebellar hemisphere with small punctate acute infarcts at the central left cerebellum by MRI, 06/22/2013.  Transesophageal echocardiogram, 06/24/2013 - no evidence of thrombus with PFO noted.  Unremarkable carotid artery duplex.  Carotid vertebral angiography, 06/25/2013, unremarkable cervical and cerebral angiograms, widely patent left PICA.  Initiation of Xarelto and implantable loop recorder placement, 07/16/2013 to rule out the possibility of atrial fibrillation.   Replacement of implantable loop recorder, 11/2016  Chest pain.  GXT nuclear, 01/09/2014, left ventricular ejection fraction of 57%, small very focal stress induced apical defect, probably normal variant.  PFO noted by transesophageal echocardiogram, 06/24/2013.Echo 2016 normal EF, Mild LVH, Mild-Moderate MR  AV malformation, incidental finding, June 2013 by MRI; AVM repair, 06/15/2014.  Hypertension.  Dyslipidemia.  GERD.  Intermittent tobacco abuse.  Obstructive sleep apnea with CPAP.  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    Allergies  No Known Allergies    Current Medications    Current Outpatient Medications:     atorvastatin (LIPITOR) 20 MG tablet, Take 1 tablet by mouth Daily., Disp: , Rfl:     cholecalciferol (VITAMIN D3) 1000 UNITS tablet, Take 2 tablets by mouth  "Daily., Disp: , Rfl:     coenzyme Q10 100 MG capsule, Take 2 capsules by mouth Daily., Disp: , Rfl:     esomeprazole (nexIUM) 40 MG capsule, Take 1 capsule by mouth Every Morning Before Breakfast., Disp: , Rfl:     lisinopril (PRINIVIL,ZESTRIL) 10 MG tablet, Take 1 tablet by mouth Daily., Disp: , Rfl:     multivitamin with minerals (MULTIVITAMIN ADULT PO), Take 1 tablet by mouth Daily., Disp: , Rfl:     Omega-3 Fatty Acids (FISH OIL) 1000 MG capsule capsule, Take 6 capsules by mouth Daily With Breakfast. 6 per day, Disp: , Rfl:     RESVERATROL PO, Take 2 tablets by mouth Every Night., Disp: , Rfl:     Semaglutide-Weight Management (Wegovy) 0.5 MG/0.5ML solution auto-injector, Inject 0.5 mL under the skin into the appropriate area as directed 1 (One) Time Per Week., Disp: , Rfl:     tamsulosin (FLOMAX) 0.4 MG capsule 24 hr capsule, Take 1 capsule by mouth every night at bedtime., Disp: , Rfl:     Xarelto 20 MG tablet, TAKE 1 TABLET BY MOUTH EVERY DAY, Disp: 90 tablet, Rfl: 3    History of Present Illness:     Pt presents for follow up of AF/HTN/CVA. Since we last saw the pt, pt denies any AF episodes, SOB, CP, LH, and dizziness, syncope. Denies any hospitalizations, ER visits, bleeding, or TIA/CVA symptoms. Overall feels well. He has lost 40 lbs on Wegovy. He works out some. He checks his BP on occasion and it is always normal.         Vitals:    03/26/25 0917   BP: 118/78   Pulse: 74   SpO2: 98%   Weight: 106 kg (233 lb)   Height: 188 cm (74\")     Body mass index is 29.92 kg/m².  PE:  General: NAD  Neck: no JVD, no carotid bruits, no TM  Heart RRR, NL S1, S2, S4 present, no rubs, murmurs  Lungs: CTA, no wheezes, rhonchi, or rales  Abd: soft, non-tender, NL BS  Ext: No musculoskeletal deformities, no edema, cyanosis, or clubbing  Psych: normal mood and affect    Diagnostic Data:        ECG 12 Lead    Date/Time: 3/26/2025 9:27 AM  Performed by: Judie Hernandez PA    Authorized by: Judie Hernandez PA  Comparison: " compared with previous ECG from 3/8/2023  Similar to previous ECG  Rhythm: sinus rhythm  BPM: 74             1. Paroxysmal atrial fibrillation    2. Cerebrovascular accident (CVA), unspecified mechanism    3. PFO (patent foramen ovale)    4. Essential hypertension    5. MOJGAN on CPAP          Plan:    1. PAF: asymptomatic  -CHADsVasc=3, Continue Xarelto.  Patient tolerating it well.      2. CVA:   -see # 1. Lifelong anticoagulation.      3. PFO: Asymptomatic at this time.  - Echocardiogram 2016, normal EF, mild LVH  -Echo 3/2/22: LVEF 56-60%, mild concentric hypertrophy, LV diastolic function was normal, mild LA enlargement   - Echo next year.      4. HTN:   -Controlled, continue medications; has lost weight with Wegovy.      5. MOJGAN on CPAP      F/up in 12 months    Electronically signed by DOLORES Monae, 03/26/25, 9:25 AM EDT.

## 2025-03-26 NOTE — PROGRESS NOTES
"March 26, 2025    Hello, may I speak with Donavan Winter? Yes.    My name is Laurita VERDE      I am  with [facility] CARDIOLOGY  [address] [facility]    [facility] [place] [address] [phone].    Before we get started may I verify your date of birth? 1960, Yes, correct.    I am calling to officially welcome you to our practice and ask about your recent visit. Is this a good time to talk? yes    Tell me about your visit with us. What things went well?  \"everything fine-note here 30 minutes.\"       We're always looking for ways to make our patients' experiences even better. Do you have recommendations on ways we may improve?  no    Overall were you satisfied with your first visit to our practice? yes       I appreciate you taking the time to speak with me today. Is there anything else I can do for you? no      Thank you, and have a great day.      "